# Patient Record
Sex: MALE | Race: WHITE | Employment: FULL TIME | URBAN - METROPOLITAN AREA
[De-identification: names, ages, dates, MRNs, and addresses within clinical notes are randomized per-mention and may not be internally consistent; named-entity substitution may affect disease eponyms.]

---

## 2023-03-02 ENCOUNTER — HOSPITAL ENCOUNTER (EMERGENCY)
Age: 43
Discharge: HOME | End: 2023-03-02
Attending: EMERGENCY MEDICINE
Payer: COMMERCIAL

## 2023-03-02 ENCOUNTER — APPOINTMENT (OUTPATIENT)
Dept: GENERAL RADIOLOGY | Age: 43
End: 2023-03-02
Payer: COMMERCIAL

## 2023-03-02 VITALS
DIASTOLIC BLOOD PRESSURE: 76 MMHG | OXYGEN SATURATION: 97 % | TEMPERATURE: 97.8 F | SYSTOLIC BLOOD PRESSURE: 120 MMHG | HEART RATE: 90 BPM | RESPIRATION RATE: 16 BRPM | BODY MASS INDEX: 22.53 KG/M2 | WEIGHT: 170 LBS | HEIGHT: 73 IN

## 2023-03-02 DIAGNOSIS — I47.1 PAROXYSMAL SUPRAVENTRICULAR TACHYCARDIA (HCC): Primary | ICD-10-CM

## 2023-03-02 LAB
ACETAMINOPHEN LEVEL: <5 MCG/ML (ref 10–30)
ALBUMIN SERPL-MCNC: 4.6 G/DL (ref 3.5–5.2)
ALP BLD-CCNC: 68 U/L (ref 40–129)
ALT SERPL-CCNC: 11 U/L (ref 0–40)
AMPHETAMINE SCREEN, URINE: NOT DETECTED
AMYLASE: 93 U/L (ref 20–100)
ANION GAP SERPL CALCULATED.3IONS-SCNC: 12 MMOL/L (ref 7–16)
AST SERPL-CCNC: 15 U/L (ref 0–39)
BACTERIA: NORMAL /HPF
BARBITURATE SCREEN URINE: NOT DETECTED
BENZODIAZEPINE SCREEN, URINE: NOT DETECTED
BILIRUB SERPL-MCNC: 0.6 MG/DL (ref 0–1.2)
BILIRUBIN DIRECT: <0.2 MG/DL (ref 0–0.3)
BILIRUBIN URINE: NEGATIVE
BILIRUBIN, INDIRECT: NORMAL MG/DL (ref 0–1)
BLOOD, URINE: NORMAL
BUN BLDV-MCNC: 12 MG/DL (ref 6–20)
CALCIUM SERPL-MCNC: 10 MG/DL (ref 8.6–10.2)
CANNABINOID SCREEN URINE: NOT DETECTED
CHLORIDE BLD-SCNC: 101 MMOL/L (ref 98–107)
CLARITY: CLEAR
CO2: 26 MMOL/L (ref 22–29)
COCAINE METABOLITE SCREEN URINE: NOT DETECTED
COLOR: YELLOW
CREAT SERPL-MCNC: 1.1 MG/DL (ref 0.7–1.2)
D DIMER: 313 NG/ML DDU
ETHANOL: <10 MG/DL (ref 0–0.08)
FENTANYL SCREEN, URINE: NOT DETECTED
GFR SERPL CREATININE-BSD FRML MDRD: >60 ML/MIN/1.73
GLUCOSE BLD-MCNC: 169 MG/DL (ref 74–99)
GLUCOSE URINE: NEGATIVE MG/DL
HCT VFR BLD CALC: 48.8 % (ref 37–54)
HEMOGLOBIN: 16.6 G/DL (ref 12.5–16.5)
INR BLD: 1
KETONES, URINE: NEGATIVE MG/DL
LEUKOCYTE ESTERASE, URINE: NEGATIVE
LIPASE: 36 U/L (ref 13–60)
Lab: NORMAL
MAGNESIUM: 2.2 MG/DL (ref 1.6–2.6)
MCH RBC QN AUTO: 29.2 PG (ref 26–35)
MCHC RBC AUTO-ENTMCNC: 34 % (ref 32–34.5)
MCV RBC AUTO: 85.9 FL (ref 80–99.9)
METHADONE SCREEN, URINE: NOT DETECTED
NITRITE, URINE: NEGATIVE
OPIATE SCREEN URINE: NOT DETECTED
OXYCODONE URINE: NOT DETECTED
PDW BLD-RTO: 12.3 FL (ref 11.5–15)
PH UA: 5.5 (ref 5–9)
PHENCYCLIDINE SCREEN URINE: NOT DETECTED
PLATELET # BLD: 323 E9/L (ref 130–450)
PMV BLD AUTO: 9.1 FL (ref 7–12)
POTASSIUM SERPL-SCNC: 4.1 MMOL/L (ref 3.5–5)
PRO-BNP: 12 PG/ML (ref 0–125)
PROTEIN UA: NEGATIVE MG/DL
PROTHROMBIN TIME: 11.4 SEC (ref 9.3–12.4)
RBC # BLD: 5.68 E12/L (ref 3.8–5.8)
RBC UA: NORMAL /HPF (ref 0–2)
RENAL EPITHELIAL, UA: NORMAL /HPF
SALICYLATE, SERUM: <0.3 MG/DL (ref 0–30)
SEDIMENTATION RATE, ERYTHROCYTE: 4 MM/HR (ref 0–15)
SODIUM BLD-SCNC: 139 MMOL/L (ref 132–146)
SPECIFIC GRAVITY UA: <=1.005 (ref 1–1.03)
T4 FREE: 1.56 NG/DL (ref 0.93–1.7)
TOTAL CK: 96 U/L (ref 20–200)
TOTAL PROTEIN: 7.3 G/DL (ref 6.4–8.3)
TRICYCLIC ANTIDEPRESSANTS SCREEN SERUM: NEGATIVE NG/ML
TROPONIN, HIGH SENSITIVITY: <6 NG/L (ref 0–11)
TSH SERPL DL<=0.05 MIU/L-ACNC: 0.77 UIU/ML (ref 0.27–4.2)
UROBILINOGEN, URINE: 0.2 E.U./DL
WBC # BLD: 6.7 E9/L (ref 4.5–11.5)
WBC UA: NORMAL /HPF (ref 0–5)

## 2023-03-02 PROCEDURE — 80179 DRUG ASSAY SALICYLATE: CPT

## 2023-03-02 PROCEDURE — 84439 ASSAY OF FREE THYROXINE: CPT

## 2023-03-02 PROCEDURE — 80307 DRUG TEST PRSMV CHEM ANLYZR: CPT

## 2023-03-02 PROCEDURE — 82077 ASSAY SPEC XCP UR&BREATH IA: CPT

## 2023-03-02 PROCEDURE — 99285 EMERGENCY DEPT VISIT HI MDM: CPT

## 2023-03-02 PROCEDURE — 82550 ASSAY OF CK (CPK): CPT

## 2023-03-02 PROCEDURE — 84484 ASSAY OF TROPONIN QUANT: CPT

## 2023-03-02 PROCEDURE — 84443 ASSAY THYROID STIM HORMONE: CPT

## 2023-03-02 PROCEDURE — 36415 COLL VENOUS BLD VENIPUNCTURE: CPT

## 2023-03-02 PROCEDURE — 80076 HEPATIC FUNCTION PANEL: CPT

## 2023-03-02 PROCEDURE — 81001 URINALYSIS AUTO W/SCOPE: CPT

## 2023-03-02 PROCEDURE — 85651 RBC SED RATE NONAUTOMATED: CPT

## 2023-03-02 PROCEDURE — 6360000002 HC RX W HCPCS

## 2023-03-02 PROCEDURE — 83880 ASSAY OF NATRIURETIC PEPTIDE: CPT

## 2023-03-02 PROCEDURE — 71045 X-RAY EXAM CHEST 1 VIEW: CPT

## 2023-03-02 PROCEDURE — 96376 TX/PRO/DX INJ SAME DRUG ADON: CPT

## 2023-03-02 PROCEDURE — 80048 BASIC METABOLIC PNL TOTAL CA: CPT

## 2023-03-02 PROCEDURE — 82150 ASSAY OF AMYLASE: CPT

## 2023-03-02 PROCEDURE — 2500000003 HC RX 250 WO HCPCS: Performed by: EMERGENCY MEDICINE

## 2023-03-02 PROCEDURE — 2500000003 HC RX 250 WO HCPCS

## 2023-03-02 PROCEDURE — 96374 THER/PROPH/DIAG INJ IV PUSH: CPT

## 2023-03-02 PROCEDURE — 80143 DRUG ASSAY ACETAMINOPHEN: CPT

## 2023-03-02 PROCEDURE — 83735 ASSAY OF MAGNESIUM: CPT

## 2023-03-02 PROCEDURE — 93005 ELECTROCARDIOGRAM TRACING: CPT | Performed by: EMERGENCY MEDICINE

## 2023-03-02 PROCEDURE — 86140 C-REACTIVE PROTEIN: CPT

## 2023-03-02 PROCEDURE — 85378 FIBRIN DEGRADE SEMIQUANT: CPT

## 2023-03-02 PROCEDURE — 85610 PROTHROMBIN TIME: CPT

## 2023-03-02 PROCEDURE — 85027 COMPLETE CBC AUTOMATED: CPT

## 2023-03-02 PROCEDURE — 83690 ASSAY OF LIPASE: CPT

## 2023-03-02 RX ORDER — ADENOSINE 3 MG/ML
INJECTION, SOLUTION INTRAVENOUS
Status: COMPLETED
Start: 2023-03-02 | End: 2023-03-02

## 2023-03-02 RX ORDER — METOPROLOL TARTRATE 5 MG/5ML
INJECTION INTRAVENOUS
Status: COMPLETED
Start: 2023-03-02 | End: 2023-03-02

## 2023-03-02 RX ORDER — METOPROLOL TARTRATE 5 MG/5ML
5 INJECTION INTRAVENOUS ONCE
Status: COMPLETED | OUTPATIENT
Start: 2023-03-02 | End: 2023-03-02

## 2023-03-02 RX ORDER — ADENOSINE 3 MG/ML
12 INJECTION, SOLUTION INTRAVENOUS ONCE
Status: DISCONTINUED | OUTPATIENT
Start: 2023-03-02 | End: 2023-03-02 | Stop reason: HOSPADM

## 2023-03-02 RX ORDER — METOPROLOL TARTRATE 50 MG/1
25 TABLET, FILM COATED ORAL 2 TIMES DAILY
Qty: 60 TABLET | Refills: 0 | Status: SHIPPED | OUTPATIENT
Start: 2023-03-02

## 2023-03-02 RX ADMIN — METOPROLOL TARTRATE 5 MG: 5 INJECTION, SOLUTION INTRAVENOUS at 19:03

## 2023-03-02 RX ADMIN — METOPROLOL TARTRATE 5 MG: 5 INJECTION, SOLUTION INTRAVENOUS at 18:40

## 2023-03-02 RX ADMIN — ADENOSINE 12 MG: 3 INJECTION INTRAVENOUS at 18:04

## 2023-03-02 RX ADMIN — METOPROLOL TARTRATE 5 MG: 5 INJECTION, SOLUTION INTRAVENOUS at 18:14

## 2023-03-02 ASSESSMENT — PAIN - FUNCTIONAL ASSESSMENT
PAIN_FUNCTIONAL_ASSESSMENT: NONE - DENIES PAIN
PAIN_FUNCTIONAL_ASSESSMENT: NONE - DENIES PAIN

## 2023-03-02 NOTE — ED PROVIDER NOTES
HPI:  3/2/23, Time: 6:05 PM EST      Rapid heart rate, beginning 5 pm or 1 hours ago. The complaint has been persistent, moderate in severity, and worsened by nothing. Patient does drink caffeine all he said he only drank 1 cup of coffee today denies any illicit drugs does not drink alcohol that had a sudden onset of his heart racing as well as feeling lightheaded his blood pressure was stable had any chest pain or shortness of breath with this this is never happened before he has a history of of Crohn's disease and has ileostomy but has no other medical problems he has not been bleeding his EKG upon presentation showed a SVT with a rate of 185 with nonspecific ischemic changes patient never had any chest pain or trouble breathing there was no old EKG available for comparison but he has never had this before QT interval corrected was 459 get a repeat EKG after 12 mg of adenosine IV push he did break into a sinus rhythm but his heart rate was still tachycardic about 130 we will give him 5 of Lopressor and repeat as needed    ROS:   Pertinent positives and negatives are stated within HPI, all other systems reviewed and are negative.  --------------------------------------------- PAST HISTORY ---------------------------------------------  Past Medical History:  has a past medical history of Ulcerative colitis ([facility] [address] Past Surgical History:  has a past surgical history that includes Jejunostomy. Social History:  reports that he has never smoked. He does not have any smokeless tobacco history on file. He reports that he does not drink alcohol and does not use drugs. Family History: family history is not on file. The patients home medications have been reviewed. Allergies: Patient has no known allergies.     ---------------------------------------------------PHYSICAL EXAM--------------------------------------  Constitutional/General: Alert and oriented x3, well appearing, non toxic in NAD  Head: Normocephalic and atraumatic  Eyes: PERRL, EOMI  Mouth: Oropharynx clear, handling secretions, no trismus  Neck: Supple, full ROM, non tender to palpation in the midline, no stridor, no crepitus, no meningeal signs  Pulmonary: Lungs clear to auscultation bilaterally, no wheezes, rales, or rhonchi. Not in respiratory distress  Cardiovascular: Tachycardia  rate. Regular rhythm. No murmurs, gallops, or rubs. 2+ distal pulses  Chest: no chest wall tenderness  Abdomen: Soft. Non tender. Non distended. +BS. No rebound, guarding, or rigidity. No pulsatile masses appreciated. Musculoskeletal: Moves all extremities x 4. Warm and well perfused, no clubbing, cyanosis, or edema. Capillary refill <3 seconds  Skin: warm and dry. No rashes. Neurologic: GCS 15, CN 2-12 grossly intact, no focal deficits, symmetric strength 5/5 in the upper and lower extremities bilaterally  Psych: Normal Affect    -------------------------------------------------- RESULTS -------------------------------------------------  I have personally reviewed all laboratory and imaging results for this patient. Results are listed below.      LABS:  Results for orders placed or performed during the hospital encounter of 03/02/23   Troponin   Result Value Ref Range    Troponin, High Sensitivity <6 0 - 11 ng/L   CBC   Result Value Ref Range    WBC 6.7 4.5 - 11.5 E9/L    RBC 5.68 3.80 - 5.80 E12/L    Hemoglobin 16.6 (H) 12.5 - 16.5 g/dL    Hematocrit 48.8 37.0 - 54.0 %    MCV 85.9 80.0 - 99.9 fL    MCH 29.2 26.0 - 35.0 pg    MCHC 34.0 32.0 - 34.5 %    RDW 12.3 11.5 - 15.0 fL    Platelets 139 987 - 797 E9/L    MPV 9.1 7.0 - 12.0 fL   Basic Metabolic Panel   Result Value Ref Range    Sodium 139 132 - 146 mmol/L    Potassium 4.1 3.5 - 5.0 mmol/L    Chloride 101 98 - 107 mmol/L    CO2 26 22 - 29 mmol/L    Anion Gap 12 7 - 16 mmol/L    Glucose 169 (H) 74 - 99 mg/dL    BUN 12 6 - 20 mg/dL    Creatinine 1.1 0.7 - 1.2 mg/dL    Est, Glom Filt Rate >60 >=60 mL/min/1.73 Calcium 10.0 8.6 - 10.2 mg/dL   CK   Result Value Ref Range    Total CK 96 20 - 200 U/L   D-Dimer, Quantitative   Result Value Ref Range    D-Dimer, Quant 313 ng/mL DDU   Hepatic Function Panel   Result Value Ref Range    Total Protein 7.3 6.4 - 8.3 g/dL    Albumin 4.6 3.5 - 5.2 g/dL    Alkaline Phosphatase 68 40 - 129 U/L    ALT 11 0 - 40 U/L    AST 15 0 - 39 U/L    Total Bilirubin 0.6 0.0 - 1.2 mg/dL    Bilirubin, Direct <0.2 0.0 - 0.3 mg/dL    Bilirubin, Indirect see below 0.0 - 1.0 mg/dL   Lipase   Result Value Ref Range    Lipase 36 13 - 60 U/L   Amylase   Result Value Ref Range    Amylase 93 20 - 100 U/L   Magnesium   Result Value Ref Range    Magnesium 2.2 1.6 - 2.6 mg/dL   Brain Natriuretic Peptide   Result Value Ref Range    Pro-BNP 12 0 - 125 pg/mL   Protime-INR   Result Value Ref Range    Protime 11.4 9.3 - 12.4 sec    INR 1.0    TSH   Result Value Ref Range    TSH 0.767 0.270 - 4.200 uIU/mL   T4, Free   Result Value Ref Range    T4 Free 1.56 0.93 - 1.70 ng/dL   Urinalysis   Result Value Ref Range    Color, UA Yellow Straw/Yellow    Clarity, UA Clear Clear    Glucose, Ur Negative Negative mg/dL    Bilirubin Urine Negative Negative    Ketones, Urine Negative Negative mg/dL    Specific Gravity, UA <=1.005 1.005 - 1.030    Blood, Urine TRACE-INTACT Negative    pH, UA 5.5 5.0 - 9.0    Protein, UA Negative Negative mg/dL    Urobilinogen, Urine 0.2 <2.0 E.U./dL    Nitrite, Urine Negative Negative    Leukocyte Esterase, Urine Negative Negative   Urine Drug Screen   Result Value Ref Range    Amphetamine Screen, Urine NOT DETECTED Negative <1000 ng/mL    Barbiturate Screen, Ur NOT DETECTED Negative < 200 ng/mL    Benzodiazepine Screen, Urine NOT DETECTED Negative < 200 ng/mL    Cannabinoid Scrn, Ur NOT DETECTED Negative < 50ng/mL    Cocaine Metabolite Screen, Urine NOT DETECTED Negative < 300 ng/mL    Opiate Scrn, Ur NOT DETECTED Negative < 300ng/mL    PCP Screen, Urine NOT DETECTED Negative < 25 ng/mL Methadone Screen, Urine NOT DETECTED Negative <300 ng/mL    Oxycodone Urine NOT DETECTED Negative <100 ng/mL    FENTANYL SCREEN, URINE NOT DETECTED Negative <1 ng/mL    Drug Screen Comment: see below    Serum Drug Screen   Result Value Ref Range    Ethanol Lvl <10 mg/dL    Acetaminophen Level <5.0 (L) 10.0 - 30.0 mcg/mL    Salicylate, Serum <0.3 0.0 - 30.0 mg/dL    TCA Scrn NEGATIVE Cutoff:300 ng/mL   Sedimentation Rate   Result Value Ref Range    Sed Rate 4 0 - 15 mm/Hr   Microscopic Urinalysis   Result Value Ref Range    WBC, UA NONE 0 - 5 /HPF    RBC, UA 0-1 0 - 2 /HPF    Renal Epithelial, UA NONE SEEN /HPF    Bacteria, UA NONE SEEN None Seen /HPF   EKG 12 Lead   Result Value Ref Range    Ventricular Rate 101 BPM    Atrial Rate 101 BPM    P-R Interval 184 ms    QRS Duration 94 ms    Q-T Interval 332 ms    QTc Calculation (Bazett) 430 ms    P Axis 65 degrees    R Axis 71 degrees    T Axis 49 degrees       RADIOLOGY:  Interpreted by Radiologist.  XR CHEST PORTABLE   Final Result   No acute process.      COPD.               EKG Interpretation  Interpreted by emergency department physician    Rhythm: paroxismal supraventricular tachycardia  Rate: >160  Axis: normal  Conduction: normal  ST Segments: nonspecific changes  T Waves: non specific changes    Clinical Impression: supraventricular tachycardia  Comparison to prior EKG: None    EKG:  This EKG is signed and interpreted by me.    Rate: 101  Rhythm: Sinus  Interpretation: no acute changes  Comparison: no previous EKG available   ------------------------- NURSING NOTES AND VITALS REVIEWED ---------------------------   The nursing notes within the ED encounter and vital signs as below have been reviewed by myself.  /76   Pulse 90   Temp 97.8 °F (36.6 °C) (Oral)   Resp 16   Ht 6' 1\" (1.854 m)   Wt 170 lb (77.1 kg)   SpO2 97%   BMI 22.43 kg/m²   Oxygen Saturation Interpretation: Normal    The patient’s available past medical records and past encounters  were reviewed. ------------------------------ ED COURSE/MEDICAL DECISION MAKING----------------------  Medications   adenosine (ADENOCARD) 6 MG/2ML injection (12 mg  Given 3/2/23 1804)   metoprolol (LOPRESSOR) 5 MG/5ML injection (5 mg  Given 3/2/23 1814)   metoprolol (LOPRESSOR) injection 5 mg (5 mg IntraVENous Given 3/2/23 1903)   metoprolol (LOPRESSOR) injection 5 mg (5 mg IntraVENous Given 3/2/23 1840)             Medical Decision Making:      Rapid heart rate, beginning 5 pm or 1 hours ago. The complaint has been persistent, moderate in severity, and worsened by nothing. Patient does drink caffeine all he said he only drank 1 cup of coffee today denies any illicit drugs does not drink alcohol that had a sudden onset of his heart racing as well as feeling lightheaded his blood pressure was stable had any chest pain or shortness of breath with this this is never happened before he has a history of of Crohn's disease and has ileostomy but has no other medical problems he has not been bleeding his EKG upon presentation showed a SVT with a rate of 185 with nonspecific ischemic changes patient never had any chest pain or trouble breathing there was no old EKG available for comparison but he has never had this before QT interval corrected was 459 get a repeat EKG after 12 mg of adenosine IV push he did break into a sinus rhythm but his heart rate was still tachycardic about 130 we will give him 5 of Lopressor and repeat as needed    Re-Evaluations:             Re-evaluation. Patients symptoms are improving      Consultations:                 Critical Care: 35  Please note that the withdrawal or failure to initiate urgent interventions for this patient would likely result in a life threatening deterioration or permanent disability. Accordingly this patient received 35 minutes of critical care time, excluding separately billable procedures.         This patient's ED course included: a personal history and hemodynamically stable during their ED course. Counseling: The emergency provider has spoken with the patient and discussed todays results, in addition to providing specific details for the plan of care and counseling regarding the diagnosis and prognosis. Questions are answered at this time and they are agreeable with the plan. Discussed with patient being admitted versus being discharged home with close follow-up with a cardiologist at this point I do not feel he needs to be anticoagulated we will check electrolytes and lab work which I will check myself and if they are stable we may we will send him home with Lopressor p.o.     --------------------------------- IMPRESSION AND DISPOSITION ---------------------------------    IMPRESSION  1. Paroxysmal supraventricular tachycardia ([address]        DISPOSITION  Disposition: Discharge home  Patient condition is stable        NOTE: This report was transcribed using voice recognition software.  Every effort was made to ensure accuracy; however, inadvertent computerized transcription errors may be present        Elpidio Quiroz MD  03/03/23 0145

## 2023-03-03 LAB
EKG ATRIAL RATE: 101 BPM
EKG ATRIAL RATE: 45 BPM
EKG P AXIS: 65 DEGREES
EKG P-R INTERVAL: 184 MS
EKG Q-T INTERVAL: 262 MS
EKG Q-T INTERVAL: 332 MS
EKG QRS DURATION: 94 MS
EKG QRS DURATION: 94 MS
EKG QTC CALCULATION (BAZETT): 430 MS
EKG QTC CALCULATION (BAZETT): 459 MS
EKG R AXIS: 57 DEGREES
EKG R AXIS: 71 DEGREES
EKG T AXIS: 103 DEGREES
EKG T AXIS: 49 DEGREES
EKG VENTRICULAR RATE: 101 BPM
EKG VENTRICULAR RATE: 185 BPM

## 2023-03-03 PROCEDURE — 93010 ELECTROCARDIOGRAM REPORT: CPT | Performed by: INTERNAL MEDICINE

## 2023-03-03 NOTE — ED NOTES
Pt given discharge summary with education SVT and palpitations, pt also given Dr. Uriostegui Loss info to follow up with regarding above diagnosis, pt also aware of his metoprolol prescription awaiting at his assigned pharmacy       [facility], [address] [place] [address]  03/02/23 2052

## 2023-03-03 NOTE — DISCHARGE INSTRUCTIONS
Return if increased heart rate chest pain shortness of breath follow-up with cardiologist as soon as possible

## 2023-03-04 LAB — C-REACTIVE PROTEIN: <0.3 MG/DL (ref 0–0.4)

## 2023-03-14 ENCOUNTER — HOSPITAL ENCOUNTER (EMERGENCY)
Age: 43
Discharge: HOME | End: 2023-03-15
Attending: EMERGENCY MEDICINE
Payer: COMMERCIAL

## 2023-03-14 ENCOUNTER — APPOINTMENT (OUTPATIENT)
Dept: GENERAL RADIOLOGY | Age: 43
End: 2023-03-14
Payer: COMMERCIAL

## 2023-03-14 DIAGNOSIS — J40 BRONCHITIS: Primary | ICD-10-CM

## 2023-03-14 LAB
ALBUMIN SERPL-MCNC: 4.9 G/DL (ref 3.5–5.2)
ALP BLD-CCNC: 67 U/L (ref 40–129)
ALT SERPL-CCNC: 9 U/L (ref 0–40)
ANION GAP SERPL CALCULATED.3IONS-SCNC: 9 MMOL/L (ref 7–16)
AST SERPL-CCNC: 12 U/L (ref 0–39)
BASOPHILS ABSOLUTE: 0.02 E9/L (ref 0–0.2)
BASOPHILS RELATIVE PERCENT: 0.3 % (ref 0–2)
BILIRUB SERPL-MCNC: 1 MG/DL (ref 0–1.2)
BUN BLDV-MCNC: 9 MG/DL (ref 6–20)
CALCIUM SERPL-MCNC: 10.2 MG/DL (ref 8.6–10.2)
CHLORIDE BLD-SCNC: 101 MMOL/L (ref 98–107)
CO2: 30 MMOL/L (ref 22–29)
CREAT SERPL-MCNC: 1 MG/DL (ref 0.7–1.2)
D DIMER: 255 NG/ML DDU
EKG ATRIAL RATE: 93 BPM
EKG P AXIS: 76 DEGREES
EKG P-R INTERVAL: 132 MS
EKG Q-T INTERVAL: 352 MS
EKG QRS DURATION: 92 MS
EKG QTC CALCULATION (BAZETT): 437 MS
EKG R AXIS: 71 DEGREES
EKG T AXIS: 60 DEGREES
EKG VENTRICULAR RATE: 93 BPM
EOSINOPHILS ABSOLUTE: 0.02 E9/L (ref 0.05–0.5)
EOSINOPHILS RELATIVE PERCENT: 0.3 % (ref 0–6)
GFR SERPL CREATININE-BSD FRML MDRD: >60 ML/MIN/1.73
GLUCOSE BLD-MCNC: 107 MG/DL (ref 74–99)
HCT VFR BLD CALC: 48.8 % (ref 37–54)
HEMOGLOBIN: 16.3 G/DL (ref 12.5–16.5)
IMMATURE GRANULOCYTES #: 0.02 E9/L
IMMATURE GRANULOCYTES %: 0.3 % (ref 0–5)
LYMPHOCYTES ABSOLUTE: 0.91 E9/L (ref 1.5–4)
LYMPHOCYTES RELATIVE PERCENT: 12.4 % (ref 20–42)
MAGNESIUM: 2.3 MG/DL (ref 1.6–2.6)
MCH RBC QN AUTO: 29.4 PG (ref 26–35)
MCHC RBC AUTO-ENTMCNC: 33.4 % (ref 32–34.5)
MCV RBC AUTO: 87.9 FL (ref 80–99.9)
MONOCYTES ABSOLUTE: 0.52 E9/L (ref 0.1–0.95)
MONOCYTES RELATIVE PERCENT: 7.1 % (ref 2–12)
NEUTROPHILS ABSOLUTE: 5.86 E9/L (ref 1.8–7.3)
NEUTROPHILS RELATIVE PERCENT: 79.6 % (ref 43–80)
PDW BLD-RTO: 12 FL (ref 11.5–15)
PLATELET # BLD: 330 E9/L (ref 130–450)
PMV BLD AUTO: 9.4 FL (ref 7–12)
POTASSIUM SERPL-SCNC: 4.4 MMOL/L (ref 3.5–5)
PRO-BNP: 29 PG/ML (ref 0–125)
RBC # BLD: 5.55 E12/L (ref 3.8–5.8)
SODIUM BLD-SCNC: 140 MMOL/L (ref 132–146)
TOTAL PROTEIN: 7.6 G/DL (ref 6.4–8.3)
TROPONIN, HIGH SENSITIVITY: <6 NG/L (ref 0–11)
WBC # BLD: 7.4 E9/L (ref 4.5–11.5)

## 2023-03-14 PROCEDURE — 84484 ASSAY OF TROPONIN QUANT: CPT

## 2023-03-14 PROCEDURE — 83880 ASSAY OF NATRIURETIC PEPTIDE: CPT

## 2023-03-14 PROCEDURE — 85025 COMPLETE CBC W/AUTO DIFF WBC: CPT

## 2023-03-14 PROCEDURE — 83735 ASSAY OF MAGNESIUM: CPT

## 2023-03-14 PROCEDURE — 99285 EMERGENCY DEPT VISIT HI MDM: CPT

## 2023-03-14 PROCEDURE — 93005 ELECTROCARDIOGRAM TRACING: CPT | Performed by: PHYSICIAN ASSISTANT

## 2023-03-14 PROCEDURE — 93010 ELECTROCARDIOGRAM REPORT: CPT | Performed by: INTERNAL MEDICINE

## 2023-03-14 PROCEDURE — 71046 X-RAY EXAM CHEST 2 VIEWS: CPT

## 2023-03-14 PROCEDURE — 85378 FIBRIN DEGRADE SEMIQUANT: CPT

## 2023-03-14 PROCEDURE — 80053 COMPREHEN METABOLIC PANEL: CPT

## 2023-03-14 ASSESSMENT — PAIN - FUNCTIONAL ASSESSMENT
PAIN_FUNCTIONAL_ASSESSMENT: NONE - DENIES PAIN
PAIN_FUNCTIONAL_ASSESSMENT: NONE - DENIES PAIN

## 2023-03-14 NOTE — ED NOTES
Department of Emergency Medicine  FIRST PROVIDER TRIAGE NOTE             Independent MLP           3/14/23  11:51 AM EDT    Date of Encounter: 3/14/23   MRN: [id number]      HPI: Abdirahman Cooley is a 38 y.o. male who presents to the ED for Shortness of Breath (Shortness of breath and intermittent tingling x 1 week, worsening over past few days. Denies CP. New onset SVT diagnosed 3/2, seen at [facility] and given Adenosine. Following up with cardiology in May for echo/repeat EKG. A&O x 3, ambulatory.) and Tingling     Patient is a 40-year-old that is presenting with shortness of breath and tingling on the left side and \"not feeling right. \"  Patient states that last week he was seen on the second and was found to be in SVT at [facility]. Patient was given adenosine 12 mg and converted. Patient was started on metoprolol 5 mg. Patient states since then he just has not felt right. Patient was unable to get in with a cardiologist.  Patient states he never comes emergency department is just in fear something else possibly might be going on. Patient has a history of ulcerative colitis. ROS: Negative for vomiting, diarrhea, urinary complaints, rash, or headache. PE: Gen Appearance/Constitutional: alert  HEENT: NC/NT. PERRLA,  Airway patent. Neck: supple     Initial Plan of Care: All treatment areas with department are currently occupied. Plan to order/Initiate the following while awaiting opening in ED: labs, EKG, and imaging studies.   Initiate Treatment-Testing, Proceed toTreatment Area When Bed Available for ED Attending/MLP to Continue Care    Electronically signed by Jose D Gann PA-C   DD: 3/14/23       Jose D Gann PA-C  03/14/23 5884

## 2023-03-15 ENCOUNTER — APPOINTMENT (OUTPATIENT)
Dept: CT IMAGING | Age: 43
End: 2023-03-15
Payer: COMMERCIAL

## 2023-03-15 VITALS
HEART RATE: 82 BPM | WEIGHT: 167 LBS | SYSTOLIC BLOOD PRESSURE: 117 MMHG | HEIGHT: 73 IN | BODY MASS INDEX: 22.13 KG/M2 | DIASTOLIC BLOOD PRESSURE: 72 MMHG | OXYGEN SATURATION: 98 % | RESPIRATION RATE: 16 BRPM | TEMPERATURE: 97.9 F

## 2023-03-15 PROCEDURE — 71275 CT ANGIOGRAPHY CHEST: CPT

## 2023-03-15 PROCEDURE — 6360000004 HC RX CONTRAST MEDICATION: Performed by: RADIOLOGY

## 2023-03-15 RX ORDER — PREDNISONE 50 MG/1
50 TABLET ORAL DAILY
Qty: 5 TABLET | Refills: 0 | Status: SHIPPED | OUTPATIENT
Start: 2023-03-15 | End: 2023-03-20

## 2023-03-15 RX ADMIN — IOPAMIDOL 75 ML: 755 INJECTION, SOLUTION INTRAVENOUS at 01:20

## 2023-03-15 ASSESSMENT — PAIN - FUNCTIONAL ASSESSMENT: PAIN_FUNCTIONAL_ASSESSMENT: NONE - DENIES PAIN

## 2023-03-15 NOTE — ED NOTES
Introduced self to patient and wife. Patient states he takes lopressor since his dx of svt on 3/2 and he took the medication this am and just didn't feel right. He was tired, cold, sob and with left arm tingling. Patient denies chest pain. Patient with abd surgery hx, currently has an ileostomy,  No heart hx prior to svt dx. Patient alert, oriented and skin w/p/d. Awaiting further orders. patient placed on crm and cont pox at bedside. No questions, nad.       Hermelinda Wiseman, HARLEY  03/14/23 2925

## 2023-03-15 NOTE — ED PROVIDER NOTES
[facility] ENCOUNTER      Pt Name: Pamella Vargas  MRN: [id number]  [place] 1980  Date of evaluation: 3/14/2023  Provider: Gerry Valdes DO  PCP: Antonio Stephens MD  Note Started: 10:06 PM EDT 3/14/23    CHIEF COMPLAINT       Chief Complaint   Patient presents with    Shortness of Breath     Shortness of breath and intermittent tingling x 1 week, worsening over past few days. Denies CP. New onset SVT diagnosed 3/2, seen at [facility] and given Adenosine. Following up with cardiology in May for echo/repeat EKG. A&O x 3, ambulatory. Tingling       HISTORY OF PRESENT ILLNESS: 1 or more Elements   History From: Patient  Limitations to history : None    Pamella Vargas is a 38 y.o. male who presents to the ED due to intermittent shortness of breath and tingling in his upper extremities. Patient states that he had new onset SVT on March 2 and was seen at [facility] and given adenosine which resolved his symptoms. Patient is going to follow with cardiology but has not been able to get an appointment until May. He has an outpatient echocardiogram scheduled. Patient states that he has been increasingly short of breath over the past week especially worsened over the past several days. He denies any associated chest pain. States that he is more fatigued during this time as well. Denies any associated fever or chills. Patient has no focal neurologic deficits. Nursing Notes were all reviewed and agreed with or any disagreements were addressed in the HPI. REVIEW OF SYSTEMS :    Positives and Pertinent negatives as per HPI.      SURGICAL HISTORY     Past Surgical History:   Procedure Laterality Date    ILEOSTOMY OR JEJUNOSTOMY         [address]       Discharge Medication List as of 3/15/2023  2:07 AM        CONTINUE these medications which have NOT CHANGED    Details   metoprolol tartrate (LOPRESSOR) 50 MG tablet Take 0.5 tablets by mouth 2 times daily, Disp-60 tablet, R-0Normal             ALLERGIES     Patient has no known allergies. FAMILYHISTORY     History reviewed. No pertinent family history. SOCIAL HISTORY       Social History     Tobacco Use    Smoking status: Never   Vaping Use    Vaping Use: Never used   Substance Use Topics    Alcohol use: No    Drug use: Never       SCREENINGS        Regina Coma Scale  Eye Opening: Spontaneous  Best Verbal Response: Oriented  Best Motor Response: Obeys commands  Regina Coma Scale Score: 15                CIWA Assessment  BP: 117/72  Heart Rate: 82           PHYSICAL EXAM  1 or more Elements     ED Triage Vitals   BP Temp Temp Source Heart Rate Resp SpO2 Height Weight   03/14/23 1149 03/14/23 1048 03/14/23 1048 03/14/23 1048 03/14/23 1149 03/14/23 1048 03/14/23 1149 03/14/23 1149   (!) 141/83 97.5 °F (36.4 °C) Temporal 90 16 98 % 6' 1\" (1.854 m) 167 lb (75.8 kg)     Constitutional/General: Alert and oriented x3  Head: Normocephalic and atraumatic  Respiratory: Lungs clear to auscultation bilaterally, no wheezes, rales, or rhonchi. Not in respiratory distress  Cardiovascular:  Regular rate. Regular rhythm. No murmurs, no gallops, no rubs. 2+ distal pulses. Equal extremity pulses. Chest: No chest wall tenderness  GI:  Abdomen Soft, Non tender, Non distended. +BS. No rebound, guarding, or rigidity. No pulsatile masses. Musculoskeletal: Moves all extremities x 4. Warm and well perfused, no clubbing, no cyanosis, no edema. Capillary refill <3 seconds  Integument: skin warm and dry. No rashes.    Neurologic: GCS 15, no focal deficits    DIAGNOSTIC RESULTS   LABS:    Labs Reviewed   CBC WITH AUTO DIFFERENTIAL - Abnormal; Notable for the following components:       Result Value    Lymphocytes % 12.4 (*)     Lymphocytes Absolute 0.91 (*)     Eosinophils Absolute 0.02 (*)     All other components within normal limits   COMPREHENSIVE METABOLIC PANEL - Abnormal; Notable for the following components:    CO2 30 (*)     Glucose 107 (*)     All other components within normal limits   TROPONIN   BRAIN NATRIURETIC PEPTIDE   D-DIMER, QUANTITATIVE   MAGNESIUM       As interpreted by me, the above displayed labs are abnormal. All other labs obtained during this visit were within normal range or not returned as of this dictation. EKG Interpretation:  Interpreted by emergency department physician, Enrico Butterfield,   Rate: 93  Rhythm: Sinus  Interpretation: non-specific EKG  Comparison: stable as compared to patient's most recent EKG    RADIOLOGY:   Non-plain film images such as CT, Ultrasound and MRI are read by the radiologist. Plain radiographic images are visualized and preliminarily interpreted by the ED Provider with the below findings:    No acute consolidation or pneumothorax appreciated. Interpretation per the Radiologist below, if available at the time of this note:    CTA PULMONARY W CONTRAST   Final Result   No evidence of pulmonary embolism or acute pulmonary abnormality. RECOMMENDATIONS:   Careful clinical correlation and follow up recommended. XR CHEST (2 VW)   Final Result   Bronchitis with hyperinflated lungs. There is no focal consolidation. XR CHEST (2 VW)    Result Date: 3/14/2023  EXAMINATION: TWO XRAY VIEWS OF THE CHEST 3/14/2023 2:18 pm COMPARISON: 03/02/2023. HISTORY: ORDERING SYSTEM PROVIDED HISTORY: CP recent SVT converted last week TECHNOLOGIST PROVIDED HISTORY: Reason for exam:->CP recent SVT converted last week What reading provider will be dictating this exam?->CRC FINDINGS: Heart size is normal.  There is emphysema with hyperinflated lungs. Hazy and strandy perihilar densities are noted concerning for bronchitis. There is no focal consolidation or pleural effusion. Bronchitis with hyperinflated lungs. There is no focal consolidation. No results found.     PROCEDURES   Unless otherwise noted below, none     CRITICAL CARE TIME (.cct)   None    PAST MEDICAL HISTORY/Chronic Conditions Affecting Care    has a past medical history of Ulcerative colitis (HCC).     EMERGENCY DEPARTMENT COURSE    Vitals:    Vitals:    03/14/23 2206 03/14/23 2220 03/15/23 0116 03/15/23 0210   BP:  116/72 118/75 117/72   Pulse: (!) 120 84 81 82   Resp:  16 20 16   Temp:   97.9 °F (36.6 °C) 97.9 °F (36.6 °C)   TempSrc:   Oral Oral   SpO2:  98% 98% 98%   Weight:       Height:           Patient was given the following medications:  Medications   iopamidol (ISOVUE-370) 76 % injection 75 mL (75 mLs IntraVENous Given 3/15/23 0120)         Is this patient to be included in the SEP-1 Core Measure due to severe sepsis or septic shock?   No Exclusion criteria - the patient is NOT to be included for SEP-1 Core Measure due to: Infection is not suspected      Medical Decision Making/Differential Diagnosis:    CC/HPI Summary, Social Determinants of health, Records Reviewed, DDx, testing done/not done, ED Course, Reassessment, disposition considerations/shared decision making with patient, consults, disposition:            Chronic Conditions:   Past Medical History:   Diagnosis Date    Ulcerative colitis (HCC)        CONSULTS: (Who and What was discussed)  None    Discussion with Other Profesionals : None    Social Determinants : None    Records Reviewed : None    CC/HPI Summary, DDx, ED Course, and Reassessment: CBC is ordered to evaluate for any signs of infection or inflammation by obtaining a WBC count, or any signs of acute anemia by interpreting hemoglobin. CMP was ordered to evaluate for any electrolyte imbalances, kidney function, or any elevations in anion gap. Troponin ordered to evaluate for possible cardiac etiology of symptoms including but not limited to STEMI or NSTEMI. BNP ordered to evaluate for possible cardiac failure or worsening cardiac function. D-dimer was ordered to evaluate for elevations due to patient's presentation, and if elevated, may suggest  possible pulmonary embolism as etiology of symptoms. Chest x-ray is ordered to evaluate for any possible signs of pneumonia, pleural effusions, cardiomegaly, pneumothorax, atelectasis, rib or sternal abnormalities including fractures. CTA chest with contrast ordered to evaluate for, but without limitation to, pulmonary embolism, effusions, pneumonia, dissection or acute bony fractures. CBC and CMP were markedly benign within normal limits. Troponin was less than 6. BNP was 29 and magnesium was normal.  D-dimer was elevated to 255 and he was sent for a follow-up CTA. CTA did not reveal any evidence of pulmonary embolism or acute pulmonary abnormality. Chest x-ray revealed evidence of bronchitis with hyperinflated lungs. Patient will be discharged with a prescription for prednisone for suspected bronchitis. He has been instructed to follow with his primary care provider regarding his symptoms if they persist.  He was told to come back to the ED if symptoms return, worsen or change at any time. Disposition Considerations (Tests not ordered but considered, Shared Decision Making, Pt Expectation of Test or Tx.):     FINAL IMPRESSION      1.  Bronchitis          DISPOSITION/PLAN     DISPOSITION Decision To Discharge 03/15/2023 02:07:31 AM    PATIENT REFERRED TO:  Brooks Hernández MD  [address] [place] [id number]  [phone]    Schedule an appointment as soon as possible for a visit in 2 days  For follow up in 48 hours, sooner if worsening    DISCHARGE MEDICATIONS:  Discharge Medication List as of 3/15/2023  2:07 AM        START taking these medications    Details   predniSONE (DELTASONE) 50 MG tablet Take 1 tablet by mouth daily for 5 days, Disp-5 tablet, R-0Print             DISCONTINUED MEDICATIONS:  Discharge Medication List as of 3/15/2023  2:07 AM               (Please note that portions of this note were completed with a voice recognition program.  Efforts were made to edit the dictations but occasionally words are mis-transcribed.)    Wilda Jama DO (electronically signed)       Wilda Jama DO  Resident  03/15/23 3468